# Patient Record
Sex: MALE | Race: WHITE | Employment: FULL TIME | URBAN - METROPOLITAN AREA
[De-identification: names, ages, dates, MRNs, and addresses within clinical notes are randomized per-mention and may not be internally consistent; named-entity substitution may affect disease eponyms.]

---

## 2022-06-06 ENCOUNTER — HOSPITAL ENCOUNTER (EMERGENCY)
Age: 27
Discharge: HOME | End: 2022-06-06
Payer: OTHER GOVERNMENT

## 2022-06-06 VITALS
RESPIRATION RATE: 14 BRPM | WEIGHT: 204 LBS | HEIGHT: 73 IN | TEMPERATURE: 97.9 F | HEART RATE: 52 BPM | BODY MASS INDEX: 27.04 KG/M2 | OXYGEN SATURATION: 97 % | DIASTOLIC BLOOD PRESSURE: 65 MMHG | SYSTOLIC BLOOD PRESSURE: 134 MMHG

## 2022-06-06 DIAGNOSIS — H18.821 CORNEAL ABRASION DUE TO CONTACT LENS, RIGHT: Primary | ICD-10-CM

## 2022-06-06 PROCEDURE — 6370000000 HC RX 637 (ALT 250 FOR IP): Performed by: PHYSICIAN ASSISTANT

## 2022-06-06 PROCEDURE — 99283 EMERGENCY DEPT VISIT LOW MDM: CPT

## 2022-06-06 RX ORDER — CIPROFLOXACIN HYDROCHLORIDE 3.5 MG/ML
1 SOLUTION/ DROPS TOPICAL ONCE
Status: DISCONTINUED | OUTPATIENT
Start: 2022-06-06 | End: 2022-06-06

## 2022-06-06 RX ORDER — HYDROCODONE BITARTRATE AND ACETAMINOPHEN 5; 325 MG/1; MG/1
1 TABLET ORAL EVERY 6 HOURS PRN
Qty: 5 TABLET | Refills: 0 | Status: SHIPPED | OUTPATIENT
Start: 2022-06-06 | End: 2022-06-08

## 2022-06-06 RX ORDER — TETRACAINE HYDROCHLORIDE 5 MG/ML
2 SOLUTION OPHTHALMIC ONCE
Status: COMPLETED | OUTPATIENT
Start: 2022-06-06 | End: 2022-06-06

## 2022-06-06 RX ORDER — MOXIFLOXACIN 5 MG/ML
1 SOLUTION/ DROPS OPHTHALMIC 3 TIMES DAILY
Qty: 3 ML | Refills: 0 | Status: SHIPPED | OUTPATIENT
Start: 2022-06-06 | End: 2022-06-13

## 2022-06-06 RX ORDER — HYDROCODONE BITARTRATE AND ACETAMINOPHEN 5; 325 MG/1; MG/1
1 TABLET ORAL ONCE
Status: COMPLETED | OUTPATIENT
Start: 2022-06-06 | End: 2022-06-06

## 2022-06-06 RX ADMIN — TETRACAINE HYDROCHLORIDE 2 DROP: 5 SOLUTION OPHTHALMIC at 21:19

## 2022-06-06 RX ADMIN — CIPROFLOXACIN HYDROCHLORIDE 0.5 INCH: 3 OINTMENT OPHTHALMIC at 22:22

## 2022-06-06 RX ADMIN — FLUORESCEIN SODIUM 1 EACH: 0.6 STRIP OPHTHALMIC at 21:19

## 2022-06-06 RX ADMIN — HYDROCODONE BITARTRATE AND ACETAMINOPHEN 1 TABLET: 5; 325 TABLET ORAL at 22:10

## 2022-06-06 ASSESSMENT — PAIN SCALES - GENERAL: PAINLEVEL_OUTOF10: 3

## 2022-06-06 NOTE — Clinical Note
Mayur Baxter was seen and treated in our emergency department on 6/6/2022. He may return to work on 06/08/2022. If you have any questions or concerns, please don't hesitate to call.       Grant Ortega PA-C

## 2022-06-07 NOTE — ED PROVIDER NOTES
[facility]  Department of Emergency Medicine   ED  Encounter Note  Admit Date/RoomTime: 2022  9:05 PM  ED Room: 35/35    NAME: oM Mckeon  : 1995  MRN: [id number]     Chief Complaint:  Blurred Vision (right eye, all day)    History of Present Illness        Mo Mckeon is a 27 y.o. old male presenting to the emergency department by private vehicle, for non-traumatic gradual onset, persistent blurred vision, pain and burning to right eye, which began a few hour(s) prior to arrival.  There has been no obvious mechanism causing complaint. Since onset his symptoms have been worsened when he took out his contacts and moderate in severity. Associated signs & symptoms of: nothing additional. The patients tetanus status is up to date. Pt reports he takes his contacts out every night, the pair he currently wears is an older pair. Circumstances:    [x]  Contact Lens Use     []  Recent URI Sx's     []  Spontaneous Onset     []  Close Contact w/similar Sx's     []  Work Related     History of:     []   Glaucoma     []   Recent Eye Surgery     ROS   Pertinent positives and negatives are stated within HPI, all other systems reviewed and are negative. Past Medical History:  has no past medical history on file. Surgical History:  has no past surgical history on file. Social History:      Family History: family history is not on file. Allergies: Rocephin [ceftriaxone]    Physical Exam   Oxygen Saturation Interpretation: Normal.        ED Triage Vitals [22 2101]   BP Temp Temp Source Heart Rate Resp SpO2 Height Weight   134/65 97.9 °F (36.6 °C) Temporal 52 14 97 % 6' 1\" (1.854 m) 204 lb (92.5 kg)         Constitutional:  Alert, development consistent with age. HENT:  NC/NT. Airway patent. Neck:  Normal ROM. Supple. Eyes:         Pupils: equal, round, reactive to light and accommodation.        Eyelids: Bilateral upper and lower Swelling/redness:  no swelling or erythema. Conjunctiva: Bilateral no abnormal findings. Sclera: Bilateral normal appearing. Cornea: Right stipled appearance of cornea with diffuse fine fluorescein uptake over the entire cornea, no ulcers. EOM:  Intact Bilaterally. Fundoscopic:  not well visualized. Visual Acuity:  20/200 bilateral and together, he reports that is his normal without contacts. .  Integument:  No rashes, erythema present, unless noted elsewhere. Lymphatics: No lymphangitis or adenopathy noted. Neurological:  Oriented. Motor functions intact. Lab / Imaging Results   (All laboratory and radiology results have been personally reviewed by myself)  Labs:  No results found for this visit on 06/06/22. Imaging: All Radiology results interpreted by Radiologist unless otherwise noted. No orders to display       ED Course / Medical Decision Making     Medications   fluorescein ophthalmic strip 1 each (1 each Left Eye Given by Other 6/6/22 2119)   tetracaine (TETRAVISC) 0.5 % ophthalmic solution 2 drop (2 drops Ophthalmic Given by Other 6/6/22 2119)   ciprofloxacin HCl (CILOXAN) 0.3 % ophthalmic ointment 0.5 inch (0.5 inches Right Eye Given 6/6/22 2222)   HYDROcodone-acetaminophen (NORCO) 5-325 MG per tablet 1 tablet (1 tablet Oral Given 6/6/22 2210)        Re-examination:  6/6/22       Time: pt remained symptom free after tetracaine  Consult(s):   None    Procedure(s):  SLIT LAMP EXAM:  Performed By: Ana Bro PA-C. Right Eye: Cornea: diffuse fine stipling with fine speckled appearance due to fluorescein uptake over entire cornea. Flourescein stain: Positive. Anterior chamber: normal.         MDM:   PT presents to ED with blurring of right eye vision through out day and then when he took his contact out it was painful and has been painful since. Patient has very poor vision without his contacts normally. He did not have a pair glasses with him today.   Cornea is showing diffuse fluorescein uptake and a fine speckled appearance. Ciprofloxacin ointment was administered in the eye. A prescription for Vigamox drops were provided. Patient is new to this area and given referral to ophthalmology to follow-up with within the next 1 to 2 days and establish with eye doctor. Pain control also provided. Patient advised not to wear contacts until resolved, as instructed by ophthalmology. Plan of Care/Counseling:  Ashely Montanez PA-C reviewed today's visit with the patient in addition to providing specific details for the plan of care and counseling regarding the diagnosis and prognosis. Questions are answered at this time and are agreeable with the plan. Assessment      1. Corneal abrasion due to contact lens, right      Plan   Discharged home. Patient condition is good    New Medications     Discharge Medication List as of 6/6/2022 10:10 PM      START taking these medications    Details   moxifloxacin (VIGAMOX) 0.5 % ophthalmic solution Place 1 drop into the right eye 3 times daily for 7 days, Disp-3 mL, R-0Print      HYDROcodone-acetaminophen (NORCO) 5-325 MG per tablet Take 1 tablet by mouth every 6 hours as needed for Pain for up to 2 days. , Disp-5 tablet, R-0Print           Electronically signed by Ashely Montanez PA-C   DD: 6/6/22  **This report was transcribed using voice recognition software. Every effort was made to ensure accuracy; however, inadvertent computerized transcription errors may be present.   END OF ED PROVIDER NOTE       Ashely Montanez PA-C  06/07/22 0012

## 2024-04-01 ENCOUNTER — APPOINTMENT (OUTPATIENT)
Dept: GENERAL RADIOLOGY | Age: 29
End: 2024-04-01
Payer: OTHER GOVERNMENT

## 2024-04-01 ENCOUNTER — HOSPITAL ENCOUNTER (EMERGENCY)
Age: 29
Discharge: LEFT BEFORE DISCHARGE | End: 2024-04-01
Attending: EMERGENCY MEDICINE
Payer: OTHER GOVERNMENT

## 2024-04-01 VITALS
TEMPERATURE: 97.5 F | BODY MASS INDEX: 30.8 KG/M2 | OXYGEN SATURATION: 97 % | HEIGHT: 74 IN | DIASTOLIC BLOOD PRESSURE: 70 MMHG | HEART RATE: 59 BPM | RESPIRATION RATE: 16 BRPM | SYSTOLIC BLOOD PRESSURE: 146 MMHG | WEIGHT: 240 LBS

## 2024-04-01 DIAGNOSIS — R07.9 CHEST PAIN, UNSPECIFIED TYPE: ICD-10-CM

## 2024-04-01 DIAGNOSIS — Z53.21 ELOPED FROM EMERGENCY DEPARTMENT: Primary | ICD-10-CM

## 2024-04-01 LAB
ANION GAP SERPL CALCULATED.3IONS-SCNC: 8 MMOL/L (ref 7–16)
BASOPHILS # BLD: 0.04 K/UL (ref 0–0.2)
BASOPHILS NFR BLD: 0 % (ref 0–2)
BUN SERPL-MCNC: 18 MG/DL (ref 6–20)
CALCIUM SERPL-MCNC: 9.7 MG/DL (ref 8.6–10.2)
CHLORIDE SERPL-SCNC: 100 MMOL/L (ref 98–107)
CO2 SERPL-SCNC: 27 MMOL/L (ref 22–29)
CREAT SERPL-MCNC: 1 MG/DL (ref 0.7–1.2)
EOSINOPHIL # BLD: 0.26 K/UL (ref 0.05–0.5)
EOSINOPHILS RELATIVE PERCENT: 2 % (ref 0–6)
ERYTHROCYTE [DISTWIDTH] IN BLOOD BY AUTOMATED COUNT: 11.9 % (ref 11.5–15)
GFR SERPL CREATININE-BSD FRML MDRD: >90 ML/MIN/1.73M2
GLUCOSE SERPL-MCNC: 93 MG/DL (ref 74–99)
HCT VFR BLD AUTO: 47.2 % (ref 37–54)
HGB BLD-MCNC: 16.3 G/DL (ref 12.5–16.5)
IMM GRANULOCYTES # BLD AUTO: 0.09 K/UL (ref 0–0.58)
IMM GRANULOCYTES NFR BLD: 1 % (ref 0–5)
LYMPHOCYTES NFR BLD: 3.67 K/UL (ref 1.5–4)
LYMPHOCYTES RELATIVE PERCENT: 33 % (ref 20–42)
MCH RBC QN AUTO: 29.9 PG (ref 26–35)
MCHC RBC AUTO-ENTMCNC: 34.5 G/DL (ref 32–34.5)
MCV RBC AUTO: 86.4 FL (ref 80–99.9)
MONOCYTES NFR BLD: 0.81 K/UL (ref 0.1–0.95)
MONOCYTES NFR BLD: 7 % (ref 2–12)
NEUTROPHILS NFR BLD: 57 % (ref 43–80)
NEUTS SEG NFR BLD: 6.41 K/UL (ref 1.8–7.3)
PLATELET # BLD AUTO: 271 K/UL (ref 130–450)
PMV BLD AUTO: 10.7 FL (ref 7–12)
POTASSIUM SERPL-SCNC: 4.2 MMOL/L (ref 3.5–5)
RBC # BLD AUTO: 5.46 M/UL (ref 3.8–5.8)
SODIUM SERPL-SCNC: 135 MMOL/L (ref 132–146)
TROPONIN I SERPL HS-MCNC: 12 NG/L (ref 0–11)
TROPONIN I SERPL HS-MCNC: 6 NG/L (ref 0–11)
WBC OTHER # BLD: 11.3 K/UL (ref 4.5–11.5)

## 2024-04-01 PROCEDURE — 6360000002 HC RX W HCPCS

## 2024-04-01 PROCEDURE — 99285 EMERGENCY DEPT VISIT HI MDM: CPT

## 2024-04-01 PROCEDURE — 71046 X-RAY EXAM CHEST 2 VIEWS: CPT

## 2024-04-01 PROCEDURE — 96372 THER/PROPH/DIAG INJ SC/IM: CPT

## 2024-04-01 PROCEDURE — 93005 ELECTROCARDIOGRAM TRACING: CPT | Performed by: PHYSICIAN ASSISTANT

## 2024-04-01 PROCEDURE — 85025 COMPLETE CBC W/AUTO DIFF WBC: CPT

## 2024-04-01 PROCEDURE — 84484 ASSAY OF TROPONIN QUANT: CPT

## 2024-04-01 PROCEDURE — 80048 BASIC METABOLIC PNL TOTAL CA: CPT

## 2024-04-01 RX ORDER — KETOROLAC TROMETHAMINE 30 MG/ML
30 INJECTION, SOLUTION INTRAMUSCULAR; INTRAVENOUS ONCE
Status: COMPLETED | OUTPATIENT
Start: 2024-04-01 | End: 2024-04-01

## 2024-04-01 RX ADMIN — KETOROLAC TROMETHAMINE 30 MG: 30 INJECTION, SOLUTION INTRAMUSCULAR at 21:52

## 2024-04-01 ASSESSMENT — PAIN DESCRIPTION - DESCRIPTORS: DESCRIPTORS: PRESSURE;SHARP

## 2024-04-01 ASSESSMENT — PAIN DESCRIPTION - ORIENTATION: ORIENTATION: MID

## 2024-04-01 ASSESSMENT — PAIN SCALES - GENERAL: PAINLEVEL_OUTOF10: 8

## 2024-04-01 ASSESSMENT — PAIN DESCRIPTION - LOCATION: LOCATION: CHEST

## 2024-04-01 ASSESSMENT — LIFESTYLE VARIABLES
HOW OFTEN DO YOU HAVE A DRINK CONTAINING ALCOHOL: NEVER
HOW MANY STANDARD DRINKS CONTAINING ALCOHOL DO YOU HAVE ON A TYPICAL DAY: PATIENT DOES NOT DRINK

## 2024-04-01 NOTE — ED TRIAGE NOTES
Department of Emergency Medicine    FIRST PROVIDER TRIAGE NOTE             Independent MLP           4/1/24  6:22 PM EDT    Date of Encounter: 4/1/24   MRN: [id number]    Vitals:    04/01/24 1824   BP: (!) 146/70   Pulse: 59   Resp: 16   Temp: 97.5 °F (36.4 °C)   TempSrc: Temporal   SpO2: 97%   Weight: 108.9 kg (240 lb)   Height: 1.88 m (6' 2\")      HPI: Jose Belle is a 28 y.o. male who presents to the ED for Chest Pain (Since this AM)     Denies past medical history     ROS: Negative for abd pain, vomiting, or diarrhea.    Physical Exam:   Gen Appearance/Constitutional: alert  CV: regular rate     Initial Plan of Care: All treatment areas with department are currently occupied.     Plan to order/Initiate the following while awaiting opening in ED: Triage evaluation  EKG and imaging studies.    Initial Plan of Care: Initiate Treatment-Testing, Proceed toTreatment Area When Bed Available for ED Attending/MLP to Continue Care    Electronically signed by Ibeth Aceves PA-C   DD: 4/1/24

## 2024-04-02 LAB
EKG ATRIAL RATE: 55 BPM
EKG P AXIS: 22 DEGREES
EKG P-R INTERVAL: 144 MS
EKG Q-T INTERVAL: 382 MS
EKG QRS DURATION: 88 MS
EKG QTC CALCULATION (BAZETT): 365 MS
EKG R AXIS: 69 DEGREES
EKG T AXIS: 31 DEGREES
EKG VENTRICULAR RATE: 55 BPM

## 2024-04-02 PROCEDURE — 93010 ELECTROCARDIOGRAM REPORT: CPT | Performed by: INTERNAL MEDICINE

## 2024-04-02 NOTE — ED PROVIDER NOTES
Verbal Response: Oriented  Best Motor Response: Obeys commands  Marilyn Coma Scale Score: 15                CIWA Assessment  BP: (!) 146/70  Pulse: 59           PHYSICAL EXAM  1 or more Elements     ED Triage Vitals [04/01/24 1824]   BP Temp Temp Source Pulse Respirations SpO2 Height Weight - Scale   (!) 146/70 97.5 °F (36.4 °C) Temporal 59 16 97 % 1.88 m (6' 2\") 108.9 kg (240 lb)            Constitutional/General: Alert and oriented x3  Head: Normocephalic and atraumatic  Eyes: PERRL, EOMI, conjunctiva normal, sclera non icteric  ENT:  Oropharynx clear, handling secretions, no trismus, no asymmetry of the posterior oropharynx or uvular edema  Neck: Supple, full ROM, no stridor, no meningeal signs  Respiratory: Lungs clear to auscultation bilaterally, no wheezes, rales, or rhonchi. Not in respiratory distress  Cardiovascular:  Regular rate. Regular rhythm. Equal extremity pulses.   GI:  Abdomen Soft, Non tender, Non distended. No rebound, guarding, or rigidity.   Musculoskeletal: Moves all extremities x 4. Warm and well perfused, no clubbing, no cyanosis, no edema. Capillary refill <3 seconds.  Right-sided pinpoint parasternal tenderness to palpation.  Integument: skin warm and dry. No rashes.   Psychiatric: Normal Affect            DIAGNOSTIC RESULTS   LABS:    Labs Reviewed   TROPONIN - Abnormal; Notable for the following components:       Result Value    Troponin, High Sensitivity 12 (*)     All other components within normal limits   CBC WITH AUTO DIFFERENTIAL   BASIC METABOLIC PANEL   TROPONIN       As interpreted by me, the above displayed labs are abnormal. All other labs obtained during this visit were within normal range or not returned as of this dictation.      EKG Interpretation  Interpreted by emergency department physician, Holland Pimentel DO      EKG:  This EKG is signed and interpreted by me.    Rate: 55  Rhythm: Sinus  Interpretation: Normal axis.  No ST or T wave changes.  No STEMI.  QTc 365

## 2024-12-09 ENCOUNTER — APPOINTMENT (OUTPATIENT)
Dept: GENERAL RADIOLOGY | Age: 29
End: 2024-12-09
Payer: OTHER GOVERNMENT

## 2024-12-09 ENCOUNTER — HOSPITAL ENCOUNTER (EMERGENCY)
Age: 29
Discharge: HOME | End: 2024-12-09
Payer: OTHER GOVERNMENT

## 2024-12-09 VITALS
RESPIRATION RATE: 17 BRPM | SYSTOLIC BLOOD PRESSURE: 144 MMHG | BODY MASS INDEX: 30.8 KG/M2 | HEART RATE: 88 BPM | HEIGHT: 74 IN | DIASTOLIC BLOOD PRESSURE: 69 MMHG | TEMPERATURE: 97.4 F | OXYGEN SATURATION: 100 % | WEIGHT: 240 LBS

## 2024-12-09 DIAGNOSIS — M79.672 LEFT FOOT PAIN: Primary | ICD-10-CM

## 2024-12-09 DIAGNOSIS — S93.602A SPRAIN OF LEFT FOOT, INITIAL ENCOUNTER: ICD-10-CM

## 2024-12-09 PROCEDURE — 6370000000 HC RX 637 (ALT 250 FOR IP): Performed by: NURSE PRACTITIONER

## 2024-12-09 PROCEDURE — 99283 EMERGENCY DEPT VISIT LOW MDM: CPT

## 2024-12-09 PROCEDURE — 73630 X-RAY EXAM OF FOOT: CPT

## 2024-12-09 RX ORDER — OXYCODONE AND ACETAMINOPHEN 5; 325 MG/1; MG/1
1 TABLET ORAL ONCE
Status: COMPLETED | OUTPATIENT
Start: 2024-12-09 | End: 2024-12-09

## 2024-12-09 RX ADMIN — OXYCODONE HYDROCHLORIDE AND ACETAMINOPHEN 1 TABLET: 5; 325 TABLET ORAL at 12:08

## 2024-12-09 ASSESSMENT — PAIN DESCRIPTION - LOCATION
LOCATION: FOOT

## 2024-12-09 ASSESSMENT — PAIN SCALES - GENERAL
PAINLEVEL_OUTOF10: 3
PAINLEVEL_OUTOF10: 10
PAINLEVEL_OUTOF10: 10

## 2024-12-09 ASSESSMENT — PAIN DESCRIPTION - ORIENTATION
ORIENTATION: LEFT

## 2024-12-09 ASSESSMENT — PAIN DESCRIPTION - PAIN TYPE
TYPE: ACUTE PAIN
TYPE: ACUTE PAIN

## 2024-12-09 ASSESSMENT — PAIN DESCRIPTION - DESCRIPTORS
DESCRIPTORS: DISCOMFORT
DESCRIPTORS: GNAWING
DESCRIPTORS: DISCOMFORT

## 2024-12-09 ASSESSMENT — PAIN DESCRIPTION - FREQUENCY: FREQUENCY: CONTINUOUS

## 2024-12-09 ASSESSMENT — PAIN - FUNCTIONAL ASSESSMENT
PAIN_FUNCTIONAL_ASSESSMENT: 0-10
PAIN_FUNCTIONAL_ASSESSMENT: ACTIVITIES ARE NOT PREVENTED

## 2024-12-09 ASSESSMENT — PAIN DESCRIPTION - ONSET: ONSET: ON-GOING

## 2024-12-09 NOTE — ED PROVIDER NOTES
Independent JAIMIE Visit.          [facility] EMERGENCY DEPARTMENT  EMERGENCY DEPARTMENT ENCOUNTER        Pt Name: Jose Belle  MRN: [id number]  Birthdate 1995  Date of evaluation: 12/9/2024  Provider: AKANKSHA Pradhan CNP  PCP: No primary care provider on file.  Note Started: 11:54 AM EST 12/9/24    CHIEF COMPLAINT       Chief Complaint   Patient presents with    Foot Injury     Saturday walking on treadmill.  Left foot       HISTORY OF PRESENT ILLNESS: 1 or more Elements   The history is obtained from the patient as well as the patients medical record.    Jose Belle 29 y.o. male with a past medical history of No past medical history on file. presents to the emergency department for evaluation of left foot injury. The patient states that the injury occurred Saturday, 11/7/2024.  Patient states that he was walking on a treadmill and he twisted his foot and had pain to the distal left foot. He describes the pain as tender and sore.  Pain is worsened by palpation/movement/weightbearing.  Patient has full range of motion. Patient denies any distal neurologic symptoms including numbness, tingling, paralysis or coolness of the extremity. Denies joint swelling or erythema. Denies fevers or chills. No other reported injuries or other extremity tenderness or injuries. Patient denies any history of injury, fracture or surgery to this extremity. Patient has tried nothing for symptom relief. Is able to ambulate without assistive device.    Nursing Notes were all reviewed and agreed with or any disagreements were addressed in the HPI.      REVIEW OF EXTERNAL NOTE :    PDMP    REVIEW OF SYSTEMS :           Positives and Pertinent negatives as per HPI.     SURGICAL HISTORY     Past Surgical History:   Procedure Laterality Date    WISDOM TOOTH EXTRACTION         CURRENTMEDICATIONS       There are no discharge medications for this patient.      ALLERGIES     Shellfish-derived products

## 2025-04-11 ENCOUNTER — OFFICE VISIT (OUTPATIENT)
Dept: FAMILY MEDICINE CLINIC | Age: 30
End: 2025-04-11
Payer: OTHER GOVERNMENT

## 2025-04-11 VITALS
DIASTOLIC BLOOD PRESSURE: 65 MMHG | RESPIRATION RATE: 18 BRPM | HEIGHT: 74 IN | OXYGEN SATURATION: 95 % | BODY MASS INDEX: 32.73 KG/M2 | TEMPERATURE: 97.6 F | HEART RATE: 70 BPM | WEIGHT: 255 LBS | SYSTOLIC BLOOD PRESSURE: 113 MMHG

## 2025-04-11 DIAGNOSIS — Z76.89 ENCOUNTER TO ESTABLISH CARE WITH NEW PROVIDER: Primary | ICD-10-CM

## 2025-04-11 DIAGNOSIS — Z11.59 SPECIAL SCREENING EXAMINATION FOR VIRAL DISEASE: ICD-10-CM

## 2025-04-11 DIAGNOSIS — R23.8 OTHER SKIN CHANGES: ICD-10-CM

## 2025-04-11 DIAGNOSIS — M35.7 HYPERMOBILE JOINT SYNDROME OF RIGHT SHOULDER: ICD-10-CM

## 2025-04-11 DIAGNOSIS — R20.0 LEFT FACIAL NUMBNESS: ICD-10-CM

## 2025-04-11 PROCEDURE — 99213 OFFICE O/P EST LOW 20 MIN: CPT

## 2025-04-11 RX ORDER — FLUOXETINE 10 MG/1
10 CAPSULE ORAL DAILY
COMMUNITY
Start: 2024-04-24 | End: 2025-04-11

## 2025-04-11 RX ORDER — IBUPROFEN 800 MG/1
800 TABLET, FILM COATED ORAL EVERY 8 HOURS PRN
COMMUNITY
Start: 2024-04-02 | End: 2025-04-11

## 2025-04-11 RX ORDER — PRAZOSIN HYDROCHLORIDE 1 MG/1
1 CAPSULE ORAL NIGHTLY
COMMUNITY
Start: 2024-04-24 | End: 2025-04-11

## 2025-04-11 SDOH — ECONOMIC STABILITY: FOOD INSECURITY: WITHIN THE PAST 12 MONTHS, YOU WORRIED THAT YOUR FOOD WOULD RUN OUT BEFORE YOU GOT MONEY TO BUY MORE.: NEVER TRUE

## 2025-04-11 SDOH — ECONOMIC STABILITY: FOOD INSECURITY: WITHIN THE PAST 12 MONTHS, THE FOOD YOU BOUGHT JUST DIDN'T LAST AND YOU DIDN'T HAVE MONEY TO GET MORE.: NEVER TRUE

## 2025-04-11 ASSESSMENT — PATIENT HEALTH QUESTIONNAIRE - PHQ9
SUM OF ALL RESPONSES TO PHQ QUESTIONS 1-9: 0
2. FEELING DOWN, DEPRESSED OR HOPELESS: NOT AT ALL
SUM OF ALL RESPONSES TO PHQ QUESTIONS 1-9: 0
SUM OF ALL RESPONSES TO PHQ QUESTIONS 1-9: 0
1. LITTLE INTEREST OR PLEASURE IN DOING THINGS: NOT AT ALL
SUM OF ALL RESPONSES TO PHQ QUESTIONS 1-9: 0

## 2025-04-11 NOTE — PROGRESS NOTES
Jose Belle is a 29-year-old with no significant past medical history presenting with episodes of a popping sensation and then a warmth and tingling on the posterior left side of his head these episodes last 35 to 40 seconds.  He says the feeling feels like fluid are static and travels from his posterior head down towards his jaw he also.  His tongue also goes numb on the left side during these episodes.  This has been going on for several years.  He denies headaches vertigo tinnitus jaw pain.  He does say that occasionally he has episodes where the room feels like it spinning for maybe a second    He was previously taking Prozac and prazosin but has since discontinued those.  He has never been hospitalized he has had several ankle injuries and shoulder dislocations he says he has some joint hypermobility and there is a family history of Rosa-Danlos syndrome in his mother sister and nephew.    Other family history includes melanoma in his mother.  He denies any history of stroke in his family.    He denies any substance use.  Denies alcohol and tobacco use.    Blood pressure 113/65, pulse 70, temperature 97.6 °F (36.4 °C), temperature source Temporal, resp. rate 18, height 1.88 m (6' 2\"), weight 115.7 kg (255 lb), SpO2 95%.    HEENT WNL     Heart regular    Lungs clear    abd non-tender      No edema    Pulses intact   2 cafe au lait spots on back, multiple angiomas on neck and back  Normal neuro exam except a few beats of L nystagmus.   Normal cardiopulmonary exam.     Numbness/tingling-suspect TMJ versus stress reaction.  Not currently bothersome or impeding activities of daily living.  Will monitor for now.  If worsens, consider MRI  Skin changes-refer to dermatology  History of hypermobile joints-continue to monitor  Health maintenance labs as ordered    Return to office in 1 year for adult wellness visit    Attending Physician Statement  I have discussed the case, including pertinent history and exam

## 2025-04-11 NOTE — PROGRESS NOTES
[facility]  FAMILY MEDICINE RESIDENCY PROGRAM  DATE OF VISIT : 2025    Patient : Jose Belle   Age : 29 y.o.    : 1995   MRN : [id number]       Assessment & Plan:   Diagnosis Orders   1. Encounter to establish care with new provider  CBC with Auto Differential    Comprehensive Metabolic Panel      2. Left facial numbness        3. Other skin changes  [facility] - David Evans MD, Dermatology, [place]      4. Hypermobile joint syndrome of right shoulder        5. Special screening examination for viral disease  HIV Screen    Hepatitis C Antibody          Patient presents today to establish care- baseline blood work, due for HIV/HCV screening  L facial numbness- differential includes TMJ v. Stress reaction  Ongoing for several years  Not worsening in severity, not bothersome to activities of daily living  No other associated red flag symptoms  Monitor for now, obtain imaging if worsening   Skin findings concerning for possible basal cell carcinoma- refer to derm  Nodule on scalp- differential includes epidermoid cyst v. Pilar cyst v. Lipoma. Not growing, no evidence of infection. Refer to derm as above   Hypermobile joint syndrome- family hx Rosa-Danlos, monitor for now, no injuries at present  _____________________________________________________________________    Chief Complaint:   Chief Complaint   Patient presents with    New Patient    Mass      Top  head     Mole      On back would like  it looked at possible  referral        HPI:   Jose Belle is a 29 y.o. male who presents for new to provider appointment.     Facial numbness- ongoing for several years. he states that approx once per month, he will experience a build-up of pressure at the back of his head, and then it will \"pop\" he will experience a sensation of warmth and tingling down the left side of his head and his tongue. He states that it is not connected to anxiety, but it does tend to occur when he feels overstimulated.

## 2025-04-17 DIAGNOSIS — Z11.59 SPECIAL SCREENING EXAMINATION FOR VIRAL DISEASE: ICD-10-CM

## 2025-04-17 DIAGNOSIS — Z76.89 ENCOUNTER TO ESTABLISH CARE WITH NEW PROVIDER: ICD-10-CM

## 2025-04-17 LAB
ALBUMIN: 4.5 G/DL (ref 3.5–5.2)
ALP BLD-CCNC: 81 U/L (ref 40–129)
ALT SERPL-CCNC: 25 U/L (ref 0–50)
ANION GAP SERPL CALCULATED.3IONS-SCNC: 11 MMOL/L (ref 7–16)
AST SERPL-CCNC: 37 U/L (ref 0–50)
BASOPHILS ABSOLUTE: 0.05 K/UL (ref 0–0.2)
BASOPHILS RELATIVE PERCENT: 1 % (ref 0–2)
BILIRUB SERPL-MCNC: 0.5 MG/DL (ref 0–1.2)
BUN BLDV-MCNC: 18 MG/DL (ref 6–20)
CALCIUM SERPL-MCNC: 10 MG/DL (ref 8.6–10)
CHLORIDE BLD-SCNC: 105 MMOL/L (ref 98–107)
CO2: 23 MMOL/L (ref 22–29)
CREAT SERPL-MCNC: 1.2 MG/DL (ref 0.7–1.2)
EOSINOPHILS ABSOLUTE: 0.13 K/UL (ref 0.05–0.5)
EOSINOPHILS RELATIVE PERCENT: 2 % (ref 0–6)
GFR, ESTIMATED: 86 ML/MIN/1.73M2
GLUCOSE BLD-MCNC: 92 MG/DL (ref 74–99)
HCT VFR BLD CALC: 46.1 % (ref 37–54)
HEMOGLOBIN: 16.1 G/DL (ref 12.5–16.5)
IMMATURE GRANULOCYTES %: 1 % (ref 0–5)
IMMATURE GRANULOCYTES ABSOLUTE: 0.05 K/UL (ref 0–0.58)
LYMPHOCYTES ABSOLUTE: 2.63 K/UL (ref 1.5–4)
LYMPHOCYTES RELATIVE PERCENT: 31 % (ref 20–42)
MCH RBC QN AUTO: 29.2 PG (ref 26–35)
MCHC RBC AUTO-ENTMCNC: 34.9 G/DL (ref 32–34.5)
MCV RBC AUTO: 83.7 FL (ref 80–99.9)
MONOCYTES ABSOLUTE: 0.71 K/UL (ref 0.1–0.95)
MONOCYTES RELATIVE PERCENT: 8 % (ref 2–12)
NEUTROPHILS ABSOLUTE: 5.05 K/UL (ref 1.8–7.3)
NEUTROPHILS RELATIVE PERCENT: 59 % (ref 43–80)
PDW BLD-RTO: 11.8 % (ref 11.5–15)
PLATELET # BLD: 316 K/UL (ref 130–450)
PMV BLD AUTO: 11.5 FL (ref 7–12)
POTASSIUM SERPL-SCNC: 4.8 MMOL/L (ref 3.5–5.1)
RBC # BLD: 5.51 M/UL (ref 3.8–5.8)
SODIUM BLD-SCNC: 140 MMOL/L (ref 136–145)
TOTAL PROTEIN: 7 G/DL (ref 6.4–8.3)
WBC # BLD: 8.6 K/UL (ref 4.5–11.5)

## 2025-04-18 LAB
HEPATITIS C ANTIBODY: NONREACTIVE
HIV AG/AB: NONREACTIVE

## 2025-04-19 ENCOUNTER — RESULTS FOLLOW-UP (OUTPATIENT)
Dept: FAMILY MEDICINE CLINIC | Age: 30
End: 2025-04-19

## 2025-07-15 ENCOUNTER — OFFICE VISIT (OUTPATIENT)
Dept: FAMILY MEDICINE CLINIC | Age: 30
End: 2025-07-15
Payer: OTHER GOVERNMENT

## 2025-07-15 VITALS
RESPIRATION RATE: 18 BRPM | TEMPERATURE: 97.2 F | OXYGEN SATURATION: 97 % | HEIGHT: 74 IN | SYSTOLIC BLOOD PRESSURE: 132 MMHG | DIASTOLIC BLOOD PRESSURE: 63 MMHG | WEIGHT: 243 LBS | HEART RATE: 88 BPM | BODY MASS INDEX: 31.18 KG/M2

## 2025-07-15 DIAGNOSIS — M71.21 SYNOVIAL CYST OF RIGHT POPLITEAL SPACE: ICD-10-CM

## 2025-07-15 DIAGNOSIS — M25.511 ACUTE PAIN OF RIGHT SHOULDER: Primary | ICD-10-CM

## 2025-07-15 PROCEDURE — 99213 OFFICE O/P EST LOW 20 MIN: CPT

## 2025-07-15 NOTE — PROGRESS NOTES
[facility]  FAMILY MEDICINE RESIDENCY PROGRAM  DATE OF VISIT : 7/15/2025    Patient : Jose Belle   Age : 29 y.o.    : 1995   MRN : [id number]   ______________________________________________________________________    Chief Complaint:   Chief Complaint   Patient presents with    Mass     Right leg for one week     Shoulder Pain     Left sided achiness        HPI:   History obtained from the patient.   Jose Belle is a 29 y.o. male who  has no past medical history on file. presents to the clinic for shoulder/neck pain.    Right knee mass: Patient complains of a mass on the back of his right knee.  States that a friend noticed it about a week ago and brought it to his attention.  Mass was soft, nontender, nonerythematous. Mass has reduced since and is no longer present. Denies fever or any systemic findings.    Shoulder/neck pain: Patient complains of right neck and shoulder pain that started a few days ago.  Tried using tylenol which helped shortly.  Pain is sharp and radiates through right shoulder to his pectorals.  It is not tender on palpation but only elicited with movement.  ROM is not limited.  Denies numbness and tingling, weakness, neck stiffness. Pain is 7/10 in intensity.  Patient works out and lifts heavy.  Pain waxes and wanes and brought on by exercise. Better at rest.     Past Medical History:  No past medical history on file.    Past Surgical History:  Past Surgical History:   Procedure Laterality Date    WISDOM TOOTH EXTRACTION         Family History:  Family History   Problem Relation Age of Onset    Other (karri danlos) Sister        Social History:  Social History     Socioeconomic History    Marital status: Life Partner     Spouse name: None    Number of children: None    Years of education: None    Highest education level: None   Tobacco Use    Smoking status: Never    Smokeless tobacco: Never   Vaping Use    Vaping status: Some Days   Substance and Sexual Activity

## 2025-07-15 NOTE — PROGRESS NOTES
[facility]  Pre[facility]    Subjective:  Concerned for right knee mass, now resolved. Last present one week ago  Neck pain on right, sharp radiates to pectoral. Worse with movement, lifting  Pain improved with Tylenol       ROS otherwise negative     Past medical, surgical, family and social history were reviewed, non-contributory, and unchanged unless otherwise stated.    Objective:    /63 (BP Site: Left Upper Arm, Patient Position: Sitting, BP Cuff Size: Large Adult)   Pulse 88   Temp 97.2 °F (36.2 °C) (Temporal)   Resp 18   Ht 1.88 m (6' 2\")   Wt 110.2 kg (243 lb)   SpO2 97%   BMI 31.20 kg/m²     Exam is as noted by resident with the following changes, additions or corrections:    General:  NAD; alert & oriented x 3   Heart:  RRR, no murmurs, gallops, or rubs.  Lungs:  CTA bilaterally, no wheeze, rales or rhonchi  MSK  Negative impingement testing. No obvious deformity of right shoulder knee. N    Assessment/Plan:  Shoulder and Neck Pain- muscle strain. RICE. Exercises given   Knee cyst- resolved. Monitor   Follow up  prn       Attending Physician Statement  I have reviewed the chart, including any radiology or labs. I have discussed the case, including pertinent history and exam findings with the resident.  I agree with the assessment, plan and orders as documented by the resident.  Please refer to the resident note for additional information.      Electronically signed by Eric Burger MD on 7/15/2025 at 3:35 PM